# Patient Record
Sex: MALE | Race: WHITE | NOT HISPANIC OR LATINO | Employment: OTHER | URBAN - METROPOLITAN AREA
[De-identification: names, ages, dates, MRNs, and addresses within clinical notes are randomized per-mention and may not be internally consistent; named-entity substitution may affect disease eponyms.]

---

## 2018-08-15 ENCOUNTER — HOSPITAL ENCOUNTER (EMERGENCY)
Facility: HOSPITAL | Age: 73
Discharge: HOME | End: 2018-08-15
Attending: EMERGENCY MEDICINE
Payer: MEDICARE

## 2018-08-15 VITALS
WEIGHT: 170 LBS | HEIGHT: 67 IN | TEMPERATURE: 98 F | HEART RATE: 78 BPM | RESPIRATION RATE: 20 BRPM | SYSTOLIC BLOOD PRESSURE: 116 MMHG | DIASTOLIC BLOOD PRESSURE: 71 MMHG | BODY MASS INDEX: 26.68 KG/M2 | OXYGEN SATURATION: 95 %

## 2018-08-15 DIAGNOSIS — K59.00 CONSTIPATION, UNSPECIFIED CONSTIPATION TYPE: Primary | ICD-10-CM

## 2018-08-15 PROCEDURE — 99283 EMERGENCY DEPT VISIT LOW MDM: CPT | Mod: 25

## 2018-08-15 PROCEDURE — 25000003 PHARM REV CODE 250: Performed by: EMERGENCY MEDICINE

## 2018-08-15 RX ORDER — BISACODYL 5 MG
5 TABLET, DELAYED RELEASE (ENTERIC COATED) ORAL DAILY PRN
COMMUNITY
End: 2020-09-10

## 2018-08-15 RX ORDER — LACTULOSE 10 G/15ML
20 SOLUTION ORAL 2 TIMES DAILY
Qty: 600 ML | Refills: 0 | Status: SHIPPED | OUTPATIENT
Start: 2018-08-15 | End: 2018-08-25

## 2018-08-15 RX ORDER — FAMOTIDINE 20 MG/1
20 TABLET, FILM COATED ORAL 2 TIMES DAILY
Qty: 20 TABLET | Refills: 0 | Status: SHIPPED | OUTPATIENT
Start: 2018-08-15 | End: 2019-09-12

## 2018-08-15 RX ORDER — FUROSEMIDE 20 MG/1
20 TABLET ORAL 2 TIMES DAILY
COMMUNITY
End: 2020-08-19 | Stop reason: SDUPTHER

## 2018-08-15 RX ORDER — POLYETHYLENE GLYCOL 3350 17 G/17G
POWDER, FOR SOLUTION ORAL
COMMUNITY

## 2018-08-15 RX ORDER — TAMSULOSIN HYDROCHLORIDE 0.4 MG/1
0.4 CAPSULE ORAL DAILY
COMMUNITY

## 2018-08-15 RX ADMIN — LIDOCAINE HYDROCHLORIDE: 20 SOLUTION ORAL; TOPICAL at 06:08

## 2018-08-16 NOTE — DISCHARGE INSTRUCTIONS
Drink 3-4 Liters of water per day.  Take meds as prescribed.    F/U With your doctor next week.    Thank you for choosing [facility]! We appreciate you coming to us for your medical care. We hope you feel better soon! Please come back to [facility] for all of your future medical needs.      Sincerely,    Jarvis Swift MD  Medical Director  Emergency Department  [facility]

## 2018-08-26 NOTE — ED PROVIDER NOTES
"Encounter Date: 8/15/2018       History     Chief Complaint   Patient presents with    Constipation     Pt states has had problems with bowels "for a while."  pt states has been miralax and dulcolax with only small results, pt states bowels have not moved "this week."      HPI   This is a 73 y.o. male who has a past medical history of Coronary artery disease, GERD, Hyperlipidemia, and Hypertension.   The patient presents to the Emergency Department with constipation for months off and on.   Symptoms are associated with epigastric abdominal pain. Patient has a history of GERD as above.  Wife also states that patient forces emesis from time to time.  Pt denies fever, chills, blood in stool, bilious emesis.  Symptoms are aggravated by p.o. intake.  Symptoms are relieved by nothing.  Patient takes MiraLax and Dulcolax with only minor results.  Last BM was yesterday and consisted of small krystina.   Pt has a past surgical history that includes Arterial bypass surgry.     Review of patient's allergies indicates:  No Known Allergies  Past Medical History:   Diagnosis Date    Coronary artery disease     GERD (gastroesophageal reflux disease)     Hyperlipidemia     Hypertension      Past Surgical History:   Procedure Laterality Date    ARTERIAL BYPASS SURGRY       History reviewed. No pertinent family history.  Social History     Tobacco Use    Smoking status: Never Smoker    Smokeless tobacco: Never Used   Substance Use Topics    Alcohol use: No    Drug use: No     Review of Systems   Constitutional: Positive for appetite change. Negative for fever.   HENT: Negative for sore throat.    Respiratory: Negative for shortness of breath.    Cardiovascular: Negative for chest pain.   Gastrointestinal: Positive for constipation and vomiting (Forced). Negative for blood in stool and nausea.   Genitourinary: Negative for dysuria.   Musculoskeletal: Negative for back pain.   Skin: Negative for rash.   Neurological: Negative " for weakness.   Hematological: Does not bruise/bleed easily.       Physical Exam     Initial Vitals [08/15/18 1618]   BP Pulse Resp Temp SpO2   116/71 78 20 99.7 °F (37.6 °C) 95 %      MAP       --         Physical Exam    Nursing note and vitals reviewed.  Constitutional: He appears well-developed and well-nourished. No distress.   HENT:   Head: Normocephalic and atraumatic.   Mouth/Throat: Oropharynx is clear and moist.   Eyes: Conjunctivae are normal. Pupils are equal, round, and reactive to light.   Neck: Normal range of motion. Neck supple.   Cardiovascular: Normal rate, regular rhythm, normal heart sounds and intact distal pulses.   Pulmonary/Chest: Breath sounds normal. No respiratory distress. He has no wheezes. He has no rales.   Abdominal: Soft. Bowel sounds are normal. He exhibits distension (Fullness noted). There is no tenderness.   Musculoskeletal: Normal range of motion. He exhibits no edema or tenderness.   Lymphadenopathy:     He has no cervical adenopathy.   Neurological: He is alert and oriented to person, place, and time. He has normal strength.   Skin: Skin is warm and dry. Capillary refill takes less than 2 seconds. No rash noted. No erythema.   Psychiatric: He has a normal mood and affect. Thought content normal.         ED Course   Procedures  Labs Reviewed - No data to display       Imaging Results          X-Ray Abdomen Flat And Erect (Final result)  Result time 08/15/18 18:01:48    Final result by Rogelio Valera MD (08/15/18 18:01:48)                 Impression:      Moderate constipation.      Electronically signed by: Rogelio Valera MD  Date:    08/15/2018  Time:    18:01             Narrative:    EXAMINATION:  XR ABDOMEN FLAT AND ERECT    CLINICAL HISTORY:  eval for obstruction;    TECHNIQUE:  3 View of the abdomen was performed.    COMPARISON:  None    FINDINGS:  Nonobstructive bowel gas pattern.  Moderate constipation.    No obvious free air.  No portal venous gas.    No acute  fracture. Moderate DJD.  Lung bases are clear.  Bilateral inguinal hernia repair noted.                                 Medical Decision Making:   Initial Assessment:   This is an emergent evaluation of a 73 y.o.male patient with presentation of constipation.   Initial differentials include but are not limited to:  Constipation, obstruction, gastritis, gastric ulcer  Plan:  Obstructive series, laxative    ED Management:  Obstructive series unremarkable for SBO.  Patient has moderate constipation noted.  Overall impression is likely gastritis and constipation.   Rx for lactulose and Pepcid.  Recommended diet instructions and follow up with PCP.    Clinical impression and plan discussed with patient.    Pt is to call for follow up with PCP in 7 days.  Pt to return to the ED for any new or concerning symptoms.  Aftercare instructions and return precautions provided to patient.   Pt expressed understanding and agrees with plan.                         Clinical Impression:     1. Constipation, unspecified constipation type                                  Jarvis Swift MD  08/26/18 0034

## 2018-11-13 LAB
CHOL/HDLC RATIO: 2
CHOLEST SERPL-MSCNC: 130 MG/DL (ref 0–200)
HDLC SERPL-MCNC: 59 MG/DL
LDLC SERPL CALC-MCNC: 62 MG/DL
TRIGLYCERIDE (LIPID PAN): 57

## 2019-05-08 ENCOUNTER — HOSPITAL ENCOUNTER (EMERGENCY)
Facility: HOSPITAL | Age: 74
Discharge: HOME | End: 2019-05-08
Attending: SURGERY
Payer: MEDICARE

## 2019-05-08 VITALS
SYSTOLIC BLOOD PRESSURE: 117 MMHG | HEART RATE: 54 BPM | HEIGHT: 68 IN | BODY MASS INDEX: 26.93 KG/M2 | WEIGHT: 177.69 LBS | TEMPERATURE: 98 F | RESPIRATION RATE: 18 BRPM | OXYGEN SATURATION: 98 % | DIASTOLIC BLOOD PRESSURE: 67 MMHG

## 2019-05-08 DIAGNOSIS — K59.00 CONSTIPATION: Primary | ICD-10-CM

## 2019-05-08 DIAGNOSIS — R07.81 RIB PAIN ON RIGHT SIDE: ICD-10-CM

## 2019-05-08 LAB
BILIRUB UR QL STRIP: NEGATIVE
CLARITY UR REFRACT.AUTO: CLEAR
COLOR UR AUTO: YELLOW
GLUCOSE UR QL STRIP: NEGATIVE
HGB UR QL STRIP: NEGATIVE
KETONES UR QL STRIP: NEGATIVE
LEUKOCYTE ESTERASE UR QL STRIP: NEGATIVE
NITRITE UR QL STRIP: NEGATIVE
PH UR STRIP: 7 [PH] (ref 5–8)
PROT UR QL STRIP: NEGATIVE
SP GR UR STRIP: 1.01 (ref 1–1.03)
URN SPEC COLLECT METH UR: NORMAL
UROBILINOGEN UR STRIP-ACNC: NEGATIVE EU/DL

## 2019-05-08 PROCEDURE — 81003 URINALYSIS AUTO W/O SCOPE: CPT | Mod: ER

## 2019-05-08 PROCEDURE — 99284 EMERGENCY DEPT VISIT MOD MDM: CPT | Mod: ER

## 2019-05-08 RX ORDER — MELOXICAM 7.5 MG/1
7.5 TABLET ORAL DAILY
Qty: 10 TABLET | Status: SHIPPED | OUTPATIENT
Start: 2019-05-08 | End: 2019-09-12

## 2019-05-08 RX ORDER — LACTULOSE 10 G/15ML
15 SOLUTION ORAL 2 TIMES DAILY
Qty: 240 ML | Refills: 0 | Status: SHIPPED | OUTPATIENT
Start: 2019-05-08 | End: 2019-05-18

## 2019-05-08 NOTE — ED PROVIDER NOTES
Encounter Date: 5/8/2019       History     Chief Complaint   Patient presents with    Abdominal Pain     upper right side pain - been hurting for 5 days. denies taking anything for pain.     Complains of constipation, chronic and right rib pain localized over his chest wall x 5 days.  Patient denies any substernal chest pain    The history is provided by the patient.   Constipation    The current episode started several days ago. The problem occurs frequently. The problem has been unchanged. The pain is at a severity of 2/10. The stool is described as hard. There was no prior successful therapy. Prior unsuccessful therapies include diet changes.     Review of patient's allergies indicates:  No Known Allergies  Past Medical History:   Diagnosis Date    Coronary artery disease     GERD (gastroesophageal reflux disease)     Hyperlipidemia     Hypertension      Past Surgical History:   Procedure Laterality Date    ARTERIAL BYPASS SURGRY       No family history on file.  Social History     Tobacco Use    Smoking status: Never Smoker    Smokeless tobacco: Never Used   Substance Use Topics    Alcohol use: No    Drug use: No     Review of Systems   Constitutional: Negative.    HENT: Negative.    Eyes: Negative.    Respiratory: Negative.    Cardiovascular: Negative.    Gastrointestinal: Positive for constipation.   Endocrine: Negative.    Genitourinary: Negative.    Musculoskeletal: Negative.    Skin: Negative.    Allergic/Immunologic: Negative.    Neurological: Negative.    Hematological: Negative.    Psychiatric/Behavioral: Negative.        Physical Exam     Initial Vitals [05/08/19 1505]   BP Pulse Resp Temp SpO2   117/67 (!) 54 18 98.2 °F (36.8 °C) 98 %      MAP       --         Physical Exam    Nursing note and vitals reviewed.  Constitutional: He appears well-developed and well-nourished.   HENT:   Head: Normocephalic.   Mouth/Throat: Oropharynx is clear and moist.   Eyes: EOM are normal.   Cardiovascular:  Normal rate.   Pulmonary/Chest: Effort normal. No stridor.   Abdominal: Normal appearance and bowel sounds are normal. There is no rigidity and no CVA tenderness. No hernia.   Genitourinary: Testes normal.   Musculoskeletal:        Arms:  Neurological: He is alert.   Skin: Skin is warm. Capillary refill takes less than 2 seconds.   Psychiatric: He has a normal mood and affect.         ED Course   Procedures  Labs Reviewed   URINALYSIS, REFLEX TO URINE CULTURE    Narrative:     Preferred Collection Type->Urine, Clean Catch          Imaging Results          X-Ray Chest PA And Lateral (Final result)  Result time 05/08/19 15:43:54    Final result by Rogelio Valera MD (05/08/19 15:43:54)                 Impression:      No acute process seen.      Electronically signed by: Rogelio Valera MD  Date:    05/08/2019  Time:    15:43             Narrative:    EXAMINATION:  XR CHEST PA AND LATERAL    CLINICAL HISTORY:  Pleurodynia    COMPARISON:  None    FINDINGS:  Cardiac silhouette is normal.  The lungs demonstrate no evidence of active disease.  No evidence of pleural effusion or pneumothorax.  Multiple remote right-sided rib fractures are noted.  Moderate spondylosis.  Sternotomy wires are noted.                               X-Ray Abdomen AP 1 View (KUB) (Final result)  Result time 05/08/19 15:47:31    Final result by HALLE Castillo Sr., MD (05/08/19 15:47:31)                 Impression:      1. There is a prominent amount of fecal material within the colon.  2. Surgical changes      Electronically signed by: Vijay Castillo MD  Date:    05/08/2019  Time:    15:47             Narrative:    EXAMINATION:  XR ABDOMEN AP 1 VIEW    CLINICAL HISTORY:  Constipation, unspecified    COMPARISON:  08/15/2018    FINDINGS:  There is a prominent amount of fecal material within the colon.  There are surgical changes projected over the pelvis.  There is no pneumoperitoneum.  There are several sternotomy wires visualized.  There are  surgical clips projected over the mediastinum.                                 Medical Decision Making:   Initial Assessment:   Chest wall pain secondary to loose rib cartilage, constipated  ED Management:  Patient's chest x-ray does not show any acute pulmonary disease.  KUB shows constipation consistent with clinical picture.  Recommend laxatives and anti-inflammatories for rib pain                      Clinical Impression:       ICD-10-CM ICD-9-CM   1. Constipation K59.00 564.00   2. Rib pain on right side R07.81 786.50         Disposition:   Disposition: Discharged  Condition: Stable                        DELPHINE Eisenberg III, MD  05/09/19 1040

## 2019-08-05 ENCOUNTER — TELEPHONE (OUTPATIENT)
Dept: FAMILY MEDICINE | Facility: CLINIC | Age: 74
End: 2019-08-05

## 2019-08-05 NOTE — TELEPHONE ENCOUNTER
----- Message from Alice Wright sent at 8/5/2019 10:13 AM CDT -----  Contact: self, [phone]  Patient has an appointment scheduled on 9/3 but requests to be seen sooner if possible. States he was supposed to be seen in August when doctor was at . Also requests status of his medical records.

## 2019-09-03 ENCOUNTER — OFFICE VISIT (OUTPATIENT)
Dept: FAMILY MEDICINE | Facility: CLINIC | Age: 74
End: 2019-09-03
Payer: MEDICARE

## 2019-09-03 ENCOUNTER — TELEPHONE (OUTPATIENT)
Dept: FAMILY MEDICINE | Facility: CLINIC | Age: 74
End: 2019-09-03

## 2019-09-03 VITALS
BODY MASS INDEX: 26.86 KG/M2 | OXYGEN SATURATION: 96 % | WEIGHT: 177.25 LBS | DIASTOLIC BLOOD PRESSURE: 68 MMHG | HEIGHT: 68 IN | SYSTOLIC BLOOD PRESSURE: 136 MMHG | TEMPERATURE: 98 F | HEART RATE: 43 BPM

## 2019-09-03 DIAGNOSIS — G89.29 CHRONIC ABDOMINAL PAIN: ICD-10-CM

## 2019-09-03 DIAGNOSIS — K59.09 CHRONIC CONSTIPATION: ICD-10-CM

## 2019-09-03 DIAGNOSIS — I25.10 CORONARY ARTERY DISEASE INVOLVING NATIVE CORONARY ARTERY OF NATIVE HEART WITHOUT ANGINA PECTORIS: ICD-10-CM

## 2019-09-03 DIAGNOSIS — R10.9 CHRONIC ABDOMINAL PAIN: ICD-10-CM

## 2019-09-03 DIAGNOSIS — E78.2 HYPERLIPIDEMIA, MIXED: ICD-10-CM

## 2019-09-03 DIAGNOSIS — I11.9 HYPERTENSIVE HEART DISEASE WITHOUT HEART FAILURE: Primary | ICD-10-CM

## 2019-09-03 DIAGNOSIS — N40.0 BENIGN PROSTATIC HYPERPLASIA WITHOUT LOWER URINARY TRACT SYMPTOMS: ICD-10-CM

## 2019-09-03 PROCEDURE — 99214 OFFICE O/P EST MOD 30 MIN: CPT | Mod: S$PBB,,, | Performed by: INTERNAL MEDICINE

## 2019-09-03 PROCEDURE — 99999 PR PBB SHADOW E&M-EST. PATIENT-LVL III: ICD-10-PCS | Mod: PBBFAC,,, | Performed by: INTERNAL MEDICINE

## 2019-09-03 PROCEDURE — 99213 OFFICE O/P EST LOW 20 MIN: CPT | Mod: PBBFAC,PN | Performed by: INTERNAL MEDICINE

## 2019-09-03 PROCEDURE — 99999 PR PBB SHADOW E&M-EST. PATIENT-LVL III: CPT | Mod: PBBFAC,,, | Performed by: INTERNAL MEDICINE

## 2019-09-03 PROCEDURE — 99214 PR OFFICE/OUTPT VISIT, EST, LEVL IV, 30-39 MIN: ICD-10-PCS | Mod: S$PBB,,, | Performed by: INTERNAL MEDICINE

## 2019-09-03 RX ORDER — LACTULOSE 10 G/15ML
20 SOLUTION ORAL DAILY PRN
Qty: 300 ML | Refills: 5 | Status: SHIPPED | OUTPATIENT
Start: 2019-09-03 | End: 2021-07-20 | Stop reason: SDUPTHER

## 2019-09-03 NOTE — PROGRESS NOTES
[facility] Note    Chief Complaint      Chief Complaint   Patient presents with    Abdominal Pain    Bowel     on going problem       History of Present Illness      Lobo Marks is a 74 y.o. male with chronic conditions of HTN, HLD, CAD, BPH, GERD, anxiety who presents today for: re-establish care from  and complaints of abdominal pain.  Has been seeing Dr. Sharad Cr for this and has had a detailed workup.  Had plain film imaging done during ER visit for this in 5/2019 which showed a significant amount of retained feces.      HTN: BP at goal on ramipril, metoprolol, lasix.  HLD: Controlled on lipitor.  Will request lab records.    CAD, s/p CABG: sees cardiology, Dr. Jara.  Denies chest pain, shortness of breath.  On ASA, metoprolol, ramipril, atorvastatin.    BPH: Controlled on flomax.  No new or worsening symptoms.    Past Medical History:  Past Medical History:   Diagnosis Date    Coronary artery disease     GERD (gastroesophageal reflux disease)     Hyperlipidemia     Hypertension        Past Surgical History:   has a past surgical history that includes Arterial bypass surgry.    Family History:  family history is not on file.     Social History:  Social History     Tobacco Use    Smoking status: Never Smoker    Smokeless tobacco: Never Used   Substance Use Topics    Alcohol use: No    Drug use: No       Review of Systems   Constitutional: Negative for chills, fever and malaise/fatigue.   Respiratory: Negative for shortness of breath.    Cardiovascular: Negative for chest pain.   Skin: Negative for rash.   Neurological: Negative for weakness.        Medications:  Outpatient Encounter Medications as of 9/3/2019   Medication Sig Note Dispense Refill    aspirin (ECOTRIN) 81 MG EC tablet Take 81 mg by mouth once daily.       atorvastatin (LIPITOR) 40 MG tablet  10/22/2015: Received from: External Pharmacy      bisacodyl (DULCOLAX) 5 mg EC tablet Take 5 mg by mouth daily  "as needed for Constipation.       furosemide (LASIX) 20 MG tablet Take 20 mg by mouth 2 (two) times daily.       metoprolol tartrate (LOPRESSOR) 50 MG tablet  10/22/2015: Received from: External Pharmacy      ramipril (ALTACE) 5 MG capsule  10/22/2015: Received from: External Pharmacy      tamsulosin (FLOMAX) 0.4 mg Cap Take 0.4 mg by mouth once daily.       famotidine (PEPCID) 20 MG tablet Take 1 tablet (20 mg total) by mouth 2 (two) times daily.  20 tablet 0    meloxicam (MOBIC) 7.5 MG tablet Take 1 tablet (7.5 mg total) by mouth once daily. As needed for rib pain  10 tablet o    oxycodone-acetaminophen (PERCOCET) 5-325 mg per tablet  10/22/2015: Received from: External Pharmacy      polyethylene glycol (GLYCOLAX) 17 gram PwPk Take by mouth.        No facility-administered encounter medications on file as of 9/3/2019.        Allergies:  Review of patient's allergies indicates:  No Known Allergies    Health Maintenance:    There is no immunization history on file for this patient.   Health Maintenance   Topic Date Due    Hepatitis C Screening  1945    Lipid Panel  1945    TETANUS VACCINE  03/19/1964    Colonoscopy  03/19/1996    Pneumococcal Vaccine (65+ Low/Medium Risk) (1 of 2 - PCV13) 03/19/2011        Physical Exam      Vital Signs  Temp: 98.3 °F (36.8 °C)  Temp src: Oral  Pulse: (!) 43  SpO2: 96 %  BP: 136/68  BP Location: Right arm  Patient Position: Sitting  Pain Score:   8  Pain Loc: Abdomen  Height and Weight  Height: 5' 8" (172.7 cm)  Weight: 80.4 kg (177 lb 4 oz)  BSA (Calculated - sq m): 1.96 sq meters  BMI (Calculated): 27  Weight in (lb) to have BMI = 25: 164.1]    Physical Exam   Constitutional: He appears well-developed and well-nourished.   HENT:   Head: Normocephalic and atraumatic.   Right Ear: External ear normal.   Left Ear: External ear normal.   Mouth/Throat: Oropharynx is clear and moist.   Eyes: Pupils are equal, round, and reactive to light. Conjunctivae and EOM " are normal.   Cardiovascular: Normal rate, regular rhythm, normal heart sounds and intact distal pulses.   No murmur heard.  Pulmonary/Chest: Effort normal and breath sounds normal. He has no wheezes. He has no rales.   Abdominal: Soft. Bowel sounds are normal. He exhibits no distension and no abdominal bruit. There is no hepatosplenomegaly. There is tenderness (Epigastric/periumbilical, mild with deep palpation).   Vitals reviewed.       Laboratory:  CBC:      CMP:        Invalid input(s): CREATININ  URINALYSIS:  Recent Labs   Lab 05/08/19  1522   Color, UA Yellow   Specific Gravity, UA 1.010   pH, UA 7.0   Protein, UA Negative   Nitrite, UA Negative   Leukocytes, UA Negative   Urobilinogen, UA Negative      LIPIDS:      TSH:      A1C:        Assessment/Plan     Lobo Marks is a 74 y.o.male with:    1. Hypertensive heart disease without heart failure, stable  Cont current meds  2. Coronary artery disease involving native coronary artery of native heart without angina pectoris, stable   Cont current meds.    3. Hyperlipidemia, mixed, stable  Cont current meds.  4. Benign prostatic hyperplasia without lower urinary tract symptoms, stable  Cont current meds.    5. Chronic abdominal pain, unimproved  6. Chronic constipation   Still with significant constpiation despite laxatives.  Pt reports he has had some relief with lactulose so will prescribe.  Will refer to GI for second opinion.      Chronic conditions status updated as per HPI.  Other than changes above, cont current medications and maintain follow up with specialists.  Return to clinic in 6 months.    Rogelio Urbano MD  [facility] [place]

## 2019-09-05 ENCOUNTER — TELEPHONE (OUTPATIENT)
Dept: FAMILY MEDICINE | Facility: CLINIC | Age: 74
End: 2019-09-05

## 2019-09-05 ENCOUNTER — NURSE TRIAGE (OUTPATIENT)
Dept: ADMINISTRATIVE | Facility: CLINIC | Age: 74
End: 2019-09-05

## 2019-09-05 NOTE — TELEPHONE ENCOUNTER
----- Message from Tita Claros sent at 9/5/2019  4:00 PM CDT -----  Contact: Self [phone]  Patient is requesting to talk to nurse in regards to the medication for his bowls is not working.

## 2019-09-05 NOTE — TELEPHONE ENCOUNTER
Pt said the lactulose isnt working, explained to him it might take a couple of days for it to kick in. Asking if there is anything else he can take?

## 2019-09-06 NOTE — TELEPHONE ENCOUNTER
"Patient is calling with c/o the lactulose is not working. He states the dulcolax may have been better. I advised patient to the ED because he c/o severe fullness and patient stressing over no BM and his stomach hurts in the middle.     Reason for Disposition   Caller has NON-URGENT medication question about med that PCP prescribed and triager unable to answer question    Additional Information   Negative: Drug overdose and nurse unable to answer question   Negative: Caller requesting information not related to medicine   Negative: Caller requesting a prescription for Strep throat and has a positive culture result   Negative: Rash while taking a medication or within 3 days of stopping it   Negative: Immunization reaction suspected   Negative: [1] Asthma AND [2] having symptoms of asthma (cough, wheezing, etc)   Negative: MORE THAN A DOUBLE DOSE of a prescription or over-the-counter (OTC) drug   Negative: [1] DOUBLE DOSE (an extra dose or lesser amount) of over-the-counter (OTC) drug AND [2] any symptoms (e.g., dizziness, nausea, pain, sleepiness)   Negative: [1] DOUBLE DOSE (an extra dose or lesser amount) of prescription drug AND [2] any symptoms (e.g., dizziness, nausea, pain, sleepiness)   Negative: Took another person's prescription drug   Negative: [1] DOUBLE DOSE (an extra dose or lesser amount) of prescription drug AND [2] NO symptoms (Exception: a double dose of antibiotics)   Negative: Diabetes drug error or overdose (e.g., insulin or extra dose)   Negative: [1] Request for URGENT new prescription or refill of "essential" medication (i.e., likelihood of harm to patient if not taken) AND [2] triager unable to fill per unit policy   Negative: [1] Prescription not at pharmacy AND [2] was prescribed today by PCP   Negative: Pharmacy calling with prescription questions and triager unable to answer question   Negative: Caller has urgent medication question about med that PCP prescribed and triager " unable to answer question    Protocols used: MEDICATION QUESTION CALL-A-AH

## 2019-09-06 NOTE — TELEPHONE ENCOUNTER
Patient aware, has appt Next Thursday. He spoke to nurse last night they advised him to go to ER regarding severe pain, he did not go, and does not want to go.

## 2019-09-12 ENCOUNTER — LAB VISIT (OUTPATIENT)
Dept: LAB | Facility: HOSPITAL | Age: 74
End: 2019-09-12
Attending: INTERNAL MEDICINE
Payer: MEDICARE

## 2019-09-12 ENCOUNTER — OFFICE VISIT (OUTPATIENT)
Dept: GASTROENTEROLOGY | Facility: CLINIC | Age: 74
End: 2019-09-12
Payer: MEDICARE

## 2019-09-12 VITALS
HEIGHT: 68 IN | SYSTOLIC BLOOD PRESSURE: 110 MMHG | BODY MASS INDEX: 27.23 KG/M2 | DIASTOLIC BLOOD PRESSURE: 61 MMHG | WEIGHT: 179.69 LBS

## 2019-09-12 DIAGNOSIS — K58.1 IRRITABLE BOWEL SYNDROME WITH CONSTIPATION: ICD-10-CM

## 2019-09-12 DIAGNOSIS — K22.0 ACHALASIA: ICD-10-CM

## 2019-09-12 DIAGNOSIS — K58.1 IRRITABLE BOWEL SYNDROME WITH CONSTIPATION: Primary | ICD-10-CM

## 2019-09-12 LAB
ALBUMIN SERPL BCP-MCNC: 4 G/DL (ref 3.5–5.2)
ALP SERPL-CCNC: 54 U/L (ref 38–126)
ALT SERPL W/O P-5'-P-CCNC: 22 U/L (ref 10–44)
ANION GAP SERPL CALC-SCNC: 4 MMOL/L (ref 8–16)
AST SERPL-CCNC: 30 U/L (ref 15–46)
BASOPHILS # BLD AUTO: 0.08 K/UL (ref 0–0.2)
BASOPHILS NFR BLD: 1.3 % (ref 0–1.9)
BILIRUB SERPL-MCNC: 0.5 MG/DL (ref 0.1–1)
BUN SERPL-MCNC: 12 MG/DL (ref 2–20)
CALCIUM SERPL-MCNC: 9.2 MG/DL (ref 8.7–10.5)
CHLORIDE SERPL-SCNC: 98 MMOL/L (ref 95–110)
CO2 SERPL-SCNC: 34 MMOL/L (ref 23–29)
CREAT SERPL-MCNC: 0.86 MG/DL (ref 0.5–1.4)
DIFFERENTIAL METHOD: ABNORMAL
EOSINOPHIL # BLD AUTO: 0.3 K/UL (ref 0–0.5)
EOSINOPHIL NFR BLD: 4.2 % (ref 0–8)
ERYTHROCYTE [DISTWIDTH] IN BLOOD BY AUTOMATED COUNT: 12.2 % (ref 11.5–14.5)
EST. GFR  (AFRICAN AMERICAN): >60 ML/MIN/1.73 M^2
EST. GFR  (NON AFRICAN AMERICAN): >60 ML/MIN/1.73 M^2
GLUCOSE SERPL-MCNC: 79 MG/DL (ref 70–110)
HCT VFR BLD AUTO: 41.4 % (ref 40–54)
HGB BLD-MCNC: 13.6 G/DL (ref 14–18)
LYMPHOCYTES # BLD AUTO: 1.8 K/UL (ref 1–4.8)
LYMPHOCYTES NFR BLD: 30.7 % (ref 18–48)
MCH RBC QN AUTO: 32.8 PG (ref 27–31)
MCHC RBC AUTO-ENTMCNC: 32.9 G/DL (ref 32–36)
MCV RBC AUTO: 100 FL (ref 82–98)
MONOCYTES # BLD AUTO: 0.9 K/UL (ref 0.3–1)
MONOCYTES NFR BLD: 14.3 % (ref 4–15)
NEUTROPHILS # BLD AUTO: 3 K/UL (ref 1.8–7.7)
NEUTROPHILS NFR BLD: 49.5 % (ref 38–73)
PLATELET # BLD AUTO: 256 K/UL (ref 150–350)
PMV BLD AUTO: 9.4 FL (ref 9.2–12.9)
POTASSIUM SERPL-SCNC: 4.3 MMOL/L (ref 3.5–5.1)
PROT SERPL-MCNC: 7.3 G/DL (ref 6–8.4)
RBC # BLD AUTO: 4.15 M/UL (ref 4.6–6.2)
SODIUM SERPL-SCNC: 136 MMOL/L (ref 136–145)
TSH SERPL DL<=0.005 MIU/L-ACNC: 2.13 UIU/ML (ref 0.4–4)
WBC # BLD AUTO: 6 K/UL (ref 3.9–12.7)

## 2019-09-12 PROCEDURE — 99214 OFFICE O/P EST MOD 30 MIN: CPT | Mod: PBBFAC,PN | Performed by: INTERNAL MEDICINE

## 2019-09-12 PROCEDURE — 36415 COLL VENOUS BLD VENIPUNCTURE: CPT | Mod: PO

## 2019-09-12 PROCEDURE — 99999 PR PBB SHADOW E&M-EST. PATIENT-LVL IV: CPT | Mod: PBBFAC,,, | Performed by: INTERNAL MEDICINE

## 2019-09-12 PROCEDURE — 99205 PR OFFICE/OUTPT VISIT, NEW, LEVL V, 60-74 MIN: ICD-10-PCS | Mod: S$PBB,,, | Performed by: INTERNAL MEDICINE

## 2019-09-12 PROCEDURE — 80053 COMPREHEN METABOLIC PANEL: CPT | Mod: PO

## 2019-09-12 PROCEDURE — 99999 PR PBB SHADOW E&M-EST. PATIENT-LVL IV: ICD-10-PCS | Mod: PBBFAC,,, | Performed by: INTERNAL MEDICINE

## 2019-09-12 PROCEDURE — 85025 COMPLETE CBC W/AUTO DIFF WBC: CPT | Mod: PO

## 2019-09-12 PROCEDURE — 84443 ASSAY THYROID STIM HORMONE: CPT | Mod: PO

## 2019-09-12 PROCEDURE — 99205 OFFICE O/P NEW HI 60 MIN: CPT | Mod: S$PBB,,, | Performed by: INTERNAL MEDICINE

## 2019-09-12 NOTE — LETTER
September 12, 2019      Rogelio Urbano MD  [address]  [address]  [place] [address]           [place] [facility]  [address], [address]  [place] [address]  Phone: [phone]  Fax: [phone]          Patient: Lobo Marks   MR Number: [id number]   YOB: 1945   Date of Visit: 9/12/2019       Dear Dr. Rogelio Urbano:    Thank you for referring Lobo Marks to me for evaluation. Attached you will find relevant portions of my assessment and plan of care.    If you have questions, please do not hesitate to call me. I look forward to following Lobo Marks along with you.    Sincerely,    Pedro Pereira MD    Enclosure  CC:  No Recipients    If you would like to receive this communication electronically, please contact [email or web address] or [phone] to request more information on [employer] access.    For providers and/or their staff who would like to refer a patient to [facility], please contact us through our one-stop-shop provider referral line, [facility], at [phone].    If you feel you have received this communication in error or would no longer like to receive these types of communications, please e-mail [email or web address]

## 2019-09-12 NOTE — PATIENT INSTRUCTIONS
EGD Prep Instructions    [facility] [address] [place] [address]    You are scheduled for an EGD with Dr. Pereira on 10/2/19 at [facility] located at [address] [place] [address].  Check in at the admit desk, first floor of the hospital (which is the building on the left).   You will receive a call 2-3 days before your EGD to tell you the time to arrive.  If you have not received a call by the day before your procedure, call the Endoscopy Lab at [phone].    Nothing to eat or drink after midnight before the procedure.  You MAY brush your teeth.    You MAY take your blood pressure, heart, and seizure medication on the morning of the procedure, with a SIP of water.  Hold ALL other medications until after the procedure.    If you are on blood thinners THAT YOU HAVE BEEN INSTRUCTED TO HOLD BY YOUR DOCTOR FOR THIS PROCEDURE, then do NOT take this the morning of your EGD.  Do NOT stop these medications on your own, they must be approved to be held by your doctor.  Your EGD can NOT be done if you are on these medications.  Examples of blood thinners include: Coumadin, Aggrenox, Plavix, Pradaxa, Reapro, Pletal, Xarelto, Ticagrelor, Brilinta, Eliquis, and high dose aspirin (325 mg).  You do not have to stop baby aspirin 81 mg.    You will receive a call 2-3 days before your EGD to tell you the time to arrive.  If you have not received a call by the day before your procedure, call the Endoscopy department at [phone].

## 2019-09-12 NOTE — PROGRESS NOTES
GASTROENTEROLOGY CLINIC NOTE    Reason for visit: The primary encounter diagnosis was Irritable bowel syndrome with constipation. A diagnosis of Achalasia was also pertinent to this visit.  Referring provider/PCP: Rogelio Urbano MD      HPI:  Lobo Marks is a 74 y.o. male here today for chronic constipation. 2nd opinoin. Previously followed with Dr. King at [facility]. Referred by Dr. Urbano.  Hx including CAD s/p CABG on asa, BPH, achalasia.    Patient has longstanding issues of swallowing.  He regurgitates many of his foods.  He feels like the food sits in the bottom of his chest.  He has trouble keeping thick foods down.  This has been an ongoing issue for many years.  He has seen Dr. Casarez in the past for this.  He has been diagnosed with achalasia in the past.  He has refused surgery in the past.  He has undergone EGD with dilation in the past.     He also has chronic constipation.  This has been a multiyear problem.  He has tried many medications including the ones listed below.  He has associated symptoms of bloating abdominal discomfort and pain in the lower abdomen and the left upper quadrant as well. He has had to undergo a cleanout with Dr. Alexander in the past.    meds tried:  miralax  Dulcolax  lactulose      Prior Endoscopy:  Last colonoscopy was 8/2020, one small polyp, Dr. Cr recommended 5 year interval.   Last EGD was 2018. savaray dilation to 16mm.    (Portions of this note were dictated using voice recognition software and may contain dictation related errors in spelling/grammar/syntax not found on text review)    Review of Systems   Constitutional: Negative for chills and fever.   Eyes: Negative for blurred vision and double vision.   Respiratory: Negative for cough and shortness of breath.    Cardiovascular: Negative for chest pain and palpitations.   Gastrointestinal: Positive for abdominal pain, constipation, nausea and vomiting. Negative for blood in stool.  "  Musculoskeletal: Positive for neck pain. Negative for myalgias.   Skin: Negative for itching and rash.   Neurological: Negative for dizziness and headaches.   Psychiatric/Behavioral: Positive for memory loss. The patient is nervous/anxious.        Past Medical History: has a past medical history of Coronary artery disease, GERD (gastroesophageal reflux disease), Hyperlipidemia, and Hypertension.    Past Surgical History: has a past surgical history that includes Arterial bypass surgry.    Family History:family history is not on file.    Allergies: Review of patient's allergies indicates:  No Known Allergies    Social History: reports that he has never smoked. He has never used smokeless tobacco. He reports that he does not drink alcohol or use drugs.    Home medications:   Current Outpatient Medications on File Prior to Visit   Medication Sig Dispense Refill    aspirin (ECOTRIN) 81 MG EC tablet Take 81 mg by mouth once daily.      atorvastatin (LIPITOR) 40 MG tablet       bisacodyl (DULCOLAX) 5 mg EC tablet Take 5 mg by mouth daily as needed for Constipation.      furosemide (LASIX) 20 MG tablet Take 20 mg by mouth 2 (two) times daily.      lactulose (CHRONULAC) 20 gram/30 mL Soln Take 30 mLs (20 g total) by mouth daily as needed. 300 mL 5    metoprolol tartrate (LOPRESSOR) 50 MG tablet       polyethylene glycol (GLYCOLAX) 17 gram PwPk Take by mouth.      ramipril (ALTACE) 5 MG capsule       tamsulosin (FLOMAX) 0.4 mg Cap Take 0.4 mg by mouth once daily.      [DISCONTINUED] famotidine (PEPCID) 20 MG tablet Take 1 tablet (20 mg total) by mouth 2 (two) times daily. 20 tablet 0    [DISCONTINUED] meloxicam (MOBIC) 7.5 MG tablet Take 1 tablet (7.5 mg total) by mouth once daily. As needed for rib pain 10 tablet o    [DISCONTINUED] oxycodone-acetaminophen (PERCOCET) 5-325 mg per tablet        No current facility-administered medications on file prior to visit.        Vital signs:  /61   Ht 5' 8" (1.727 " m)   Wt 81.5 kg (179 lb 10.8 oz)   BMI 27.32 kg/m²     Physical Exam   Constitutional: No distress.   Eyes: Conjunctivae are normal. No scleral icterus.   Pulmonary/Chest: Effort normal. No respiratory distress.   Abdominal: Soft. He exhibits distension. He exhibits no mass.   Skin: Skin is warm. No rash noted.   Psychiatric: He has a normal mood and affect. His behavior is normal.   Vitals reviewed.      Routine labs:  Lab Results   Component Value Date    WBC 11.15 (H) 01/31/2011    HGB 12.5 (L) 01/31/2011    HCT 37.8 (L) 01/31/2011    MCV 96.4 (H) 01/31/2011     01/31/2011     Lab Results   Component Value Date    INR 1.0 01/31/2011     No results found for: IRON, FERRITIN, TIBC, FESATURATED  Lab Results   Component Value Date     01/31/2011    K 4.2 01/31/2011     01/31/2011    CO2 26 01/31/2011    BUN 13 01/31/2011    CREATININE 0.9 01/31/2011     Lab Results   Component Value Date    ALBUMIN 3.0 (L) 01/30/2011    ALT 49 (H) 01/30/2011    AST 43 (H) 01/30/2011    ALKPHOS 47 (L) 01/30/2011    BILITOT 0.5 01/30/2011     No results found for: GLUCOSE    Imaging:  Reviewed    I have reviewed over 40 pages of records the patient brings in the office.    Last colonoscopy was 8/2020, one small polyp, Dr. Cr recommended 5 year interval.   Last EGD was 2018.     Assessment:    1. Irritable bowel syndrome with constipation    2. Achalasia        Plan:    Orders Placed This Encounter    Comprehensive metabolic panel    TSH    CBC auto differential    linaCLOtide (LINZESS) 290 mcg Cap capsule    Case request GI: EGD (ESOPHAGOGASTRODUODENOSCOPY)     I had multiple long discussions with the patient. I also discussed with his wife and the patient separately after our visit.  These discussions lasted approximately 90 min in the clinic.    Regarding his difficulty swallowing, regurgitation.  I am fairly confident with the diagnosis of achalasia that has been given by Dr. Cr and supported by the  previous studies that he has had including esophagram as well as EGD.  I reviewed his records that he brought today that showed the birds beaking of the esophagus on 1 of his esophagram.  It seems as he has undergone dilations in the past (I note one dilation with savary to 16mm).  He is unsure if he has he had Botox before.  He has visited with Dr. Casarez many years back in 2015.  He tells me that he is not interested in proceeding with surgery and refuses another evaluation with Dr. Casarez.  I also mentioned the new technique of POEM for the treatment of achalasia.  He again refused a referral for this.  I discussed that if he were to 1 further investigation for his achalasia, the next step would be to repeat an EGD and we can consider dilation or Botox at that time.  He is interested in proceeding with Botox and EGD.    Regarding his constipation, I stressed the importance of a daily regimen.  He is very reluctant to even consider Linzess because it has been cost prohibitive in the past.  I will send a prescription to his pharmacy and we will work through the prior authorization process and even a patient assistance process to see if we can get him this medication.  In the meantime I recommended a MiraLax cleanout.  He should also consider a magnesium citrate cleanout.  I stressed that he needs to be on a daily regimen of MiraLax and possibly a little magnesium citrate, this must be self titrated to goal of having a bowel movement every other day or so.  If ultimately he does not clean out with MiraLax, I recommended a over-the-counter enema until he has a decent bowel movement.    He is due for colonoscopy August of 2020 and we will send him a reminder for this.      RTC based on endoscopy findings.    A total of 90 minutes were spent face-to-face with the patient during this encounter and over half of that time was spent on counseling and coordination of care.     Kevin P. Cowley, MD [facility] [place]

## 2019-09-13 ENCOUNTER — TELEPHONE (OUTPATIENT)
Dept: GASTROENTEROLOGY | Facility: CLINIC | Age: 74
End: 2019-09-13

## 2019-09-13 NOTE — TELEPHONE ENCOUNTER
----- Message from Tita Claros sent at 9/13/2019  2:26 PM CDT -----  Contact: Self [phone]  TEST RESULTS:   Patient would like to get test results.  Name of test (lab, mammo, etc.): Labs   Date of test: 9/11/19

## 2019-09-19 ENCOUNTER — TELEPHONE (OUTPATIENT)
Dept: GASTROENTEROLOGY | Facility: CLINIC | Age: 74
End: 2019-09-19

## 2019-09-19 NOTE — TELEPHONE ENCOUNTER
----- Message from Марина Arce sent at 9/19/2019  3:16 PM CDT -----  Contact: [phone]-self  Pt is requesting a callback from office, reason not given.

## 2019-09-25 ENCOUNTER — TELEPHONE (OUTPATIENT)
Dept: GASTROENTEROLOGY | Facility: CLINIC | Age: 74
End: 2019-09-25

## 2019-09-25 NOTE — TELEPHONE ENCOUNTER
Spoke with pt and went over Full liquid diet on 9/29/19-9/30/19. Clear Liquid diet on 10/1/19. Diet sheets mailed out to patients address. Verbal Understanding.

## 2019-09-26 ENCOUNTER — TELEPHONE (OUTPATIENT)
Dept: GASTROENTEROLOGY | Facility: CLINIC | Age: 74
End: 2019-09-26

## 2019-09-26 NOTE — TELEPHONE ENCOUNTER
----- Message from Karen Lora sent at 9/26/2019  3:58 PM CDT -----  Contact: KULDIP VANEGAS [[id number]]  [phone]    Patient needs to speak to Allegra. He stated it is urgent he speaks with you today before you leave.

## 2019-09-27 ENCOUNTER — TELEPHONE (OUTPATIENT)
Dept: GASTROENTEROLOGY | Facility: CLINIC | Age: 74
End: 2019-09-27

## 2019-09-27 NOTE — TELEPHONE ENCOUNTER
----- Message from Karen Paulino sent at 9/27/2019 10:41 AM CDT -----  Contact: KULDIP VANEGAS [[id number]] [phone]    Patient received the instructions to prepare for his procedure, but he said he needs to speak with Allegra. He has questions about these instructions.

## 2019-09-27 NOTE — TELEPHONE ENCOUNTER
All questions were answered regarding procedure scheduled on 10/2/19.         Patients wife's number [phone]

## 2019-09-30 ENCOUNTER — TELEPHONE (OUTPATIENT)
Dept: GASTROENTEROLOGY | Facility: CLINIC | Age: 74
End: 2019-09-30

## 2019-09-30 ENCOUNTER — TELEPHONE (OUTPATIENT)
Dept: ENDOSCOPY | Facility: HOSPITAL | Age: 74
End: 2019-09-30

## 2019-09-30 NOTE — TELEPHONE ENCOUNTER
----- Message from Annalee Serra sent at 9/30/2019 10:09 AM CDT -----  Pt called to speak with Lilibeth, please give pt a call back at [phone] concerning testing appt.      Thank you!

## 2019-09-30 NOTE — TELEPHONE ENCOUNTER
Patient would like to cancel procedure because he stated that the liquid diet is too hard. He said that he will call back and reschedule once he discussed this with his wife. He said that he would like to call in a couple of months to reschedule.

## 2019-12-03 ENCOUNTER — TELEPHONE (OUTPATIENT)
Dept: GASTROENTEROLOGY | Facility: CLINIC | Age: 74
End: 2019-12-03

## 2019-12-03 NOTE — TELEPHONE ENCOUNTER
----- Message from Margaret Madera sent at 12/2/2019  4:28 PM CST -----  Contact: [phone]/self   Patient is requesting to speak with nurse regarding scheduling some test. Please advise.

## 2019-12-03 NOTE — TELEPHONE ENCOUNTER
Spoke with pt and he would like to reschedule the EGD. Discussed dates with pt and he stated that he would talk with his wife and call me back with the date that he would like to have the EGD. Patient stated that he would like to have a colonoscopy because he does not have a BM every day. Discussed constipation plan again with Miralax, Magnesium citrate, and Enema's.

## 2020-01-28 ENCOUNTER — TELEPHONE (OUTPATIENT)
Dept: GASTROENTEROLOGY | Facility: CLINIC | Age: 75
End: 2020-01-28

## 2020-01-28 NOTE — TELEPHONE ENCOUNTER
----- Message from Allegra Hawk MA sent at 9/12/2019  4:06 PM CDT -----  Ask Korin about the Gastroparesis diet prior to EGD.       Liquid diet 2 days prior. Clear liquids the day before. NPO by midnight   Briseyda Frias(Attending)

## 2020-01-28 NOTE — TELEPHONE ENCOUNTER
----- Message from Joy Womack sent at 1/27/2020  4:46 PM CST -----  Contact: Pt   Pt requesting speak with Nurse Allegra Pt states that its urgent    Please call and advise    Phone [phone]

## 2020-01-28 NOTE — TELEPHONE ENCOUNTER
Spoke with pt and he would like to reschedule the EGD. Patient has been given dates. He will call us back with a date that he would like to schedule.       Patient will have to be on full liquids 3 days prior and clear liquids 1 day prior.

## 2020-02-18 ENCOUNTER — PATIENT OUTREACH (OUTPATIENT)
Dept: ADMINISTRATIVE | Facility: HOSPITAL | Age: 75
End: 2020-02-18

## 2020-02-20 ENCOUNTER — PATIENT OUTREACH (OUTPATIENT)
Dept: ADMINISTRATIVE | Facility: HOSPITAL | Age: 75
End: 2020-02-20

## 2020-03-02 ENCOUNTER — TELEPHONE (OUTPATIENT)
Dept: FAMILY MEDICINE | Facility: CLINIC | Age: 75
End: 2020-03-02

## 2020-03-02 NOTE — TELEPHONE ENCOUNTER
----- Message from Safia Lisa sent at 3/2/2020  8:31 AM CST -----  Contact: [phone]/ self   Patient requesting to speak with you regarding getting scheduled for an appointment in the evening. Please call.

## 2020-03-17 ENCOUNTER — TELEPHONE (OUTPATIENT)
Dept: FAMILY MEDICINE | Facility: CLINIC | Age: 75
End: 2020-03-17

## 2020-08-05 ENCOUNTER — PATIENT OUTREACH (OUTPATIENT)
Dept: ADMINISTRATIVE | Facility: HOSPITAL | Age: 75
End: 2020-08-05

## 2020-08-05 ENCOUNTER — TELEPHONE (OUTPATIENT)
Dept: ADMINISTRATIVE | Facility: HOSPITAL | Age: 75
End: 2020-08-05

## 2020-08-19 ENCOUNTER — OFFICE VISIT (OUTPATIENT)
Dept: FAMILY MEDICINE | Facility: CLINIC | Age: 75
End: 2020-08-19
Payer: MEDICARE

## 2020-08-19 ENCOUNTER — TELEPHONE (OUTPATIENT)
Dept: FAMILY MEDICINE | Facility: CLINIC | Age: 75
End: 2020-08-19

## 2020-08-19 ENCOUNTER — TELEPHONE (OUTPATIENT)
Dept: GASTROENTEROLOGY | Facility: CLINIC | Age: 75
End: 2020-08-19

## 2020-08-19 VITALS
OXYGEN SATURATION: 95 % | SYSTOLIC BLOOD PRESSURE: 130 MMHG | TEMPERATURE: 98 F | HEART RATE: 44 BPM | DIASTOLIC BLOOD PRESSURE: 70 MMHG | WEIGHT: 170.88 LBS | BODY MASS INDEX: 25.9 KG/M2 | HEIGHT: 68 IN

## 2020-08-19 DIAGNOSIS — I11.9 HYPERTENSIVE HEART DISEASE WITHOUT HEART FAILURE: ICD-10-CM

## 2020-08-19 DIAGNOSIS — Z23 NEED FOR VACCINATION: ICD-10-CM

## 2020-08-19 DIAGNOSIS — N40.0 BENIGN PROSTATIC HYPERPLASIA WITHOUT LOWER URINARY TRACT SYMPTOMS: ICD-10-CM

## 2020-08-19 DIAGNOSIS — I87.2 CHRONIC VENOUS INSUFFICIENCY: ICD-10-CM

## 2020-08-19 DIAGNOSIS — E78.2 HYPERLIPIDEMIA, MIXED: ICD-10-CM

## 2020-08-19 DIAGNOSIS — I25.10 CORONARY ARTERY DISEASE INVOLVING NATIVE CORONARY ARTERY OF NATIVE HEART WITHOUT ANGINA PECTORIS: Primary | ICD-10-CM

## 2020-08-19 DIAGNOSIS — Z12.11 SCREEN FOR COLON CANCER: ICD-10-CM

## 2020-08-19 PROCEDURE — 99214 PR OFFICE/OUTPT VISIT, EST, LEVL IV, 30-39 MIN: ICD-10-PCS | Mod: S$PBB,,, | Performed by: INTERNAL MEDICINE

## 2020-08-19 PROCEDURE — G0009 ADMIN PNEUMOCOCCAL VACCINE: HCPCS | Mod: PBBFAC,PN

## 2020-08-19 PROCEDURE — 99214 OFFICE O/P EST MOD 30 MIN: CPT | Mod: PBBFAC,PN | Performed by: INTERNAL MEDICINE

## 2020-08-19 PROCEDURE — 99214 OFFICE O/P EST MOD 30 MIN: CPT | Mod: S$PBB,,, | Performed by: INTERNAL MEDICINE

## 2020-08-19 PROCEDURE — 99999 PR PBB SHADOW E&M-EST. PATIENT-LVL IV: CPT | Mod: PBBFAC,,, | Performed by: INTERNAL MEDICINE

## 2020-08-19 PROCEDURE — 99999 PR PBB SHADOW E&M-EST. PATIENT-LVL IV: ICD-10-PCS | Mod: PBBFAC,,, | Performed by: INTERNAL MEDICINE

## 2020-08-19 RX ORDER — FUROSEMIDE 20 MG/1
20 TABLET ORAL 2 TIMES DAILY
Qty: 60 TABLET | Refills: 5 | Status: SHIPPED | OUTPATIENT
Start: 2020-08-19 | End: 2021-07-20 | Stop reason: SDUPTHER

## 2020-08-19 NOTE — TELEPHONE ENCOUNTER
----- Message from Rogelio Urbano MD sent at 8/19/2020  1:52 PM CDT -----  Regarding: EGD/Cscope  Allegra,   I saw Mr. Marks today in clinic.  I think he is ready to schedule his EGD and Cscope. Can you please contact him to schedule.    Thanks,  Rogelio Urbano

## 2020-08-19 NOTE — TELEPHONE ENCOUNTER
----- Message from Samina Parks sent at 8/19/2020  4:24 PM CDT -----  Type:  Patient Returning Call    Who Called:pt   Who Left Message for Patient:pt  Does the patient know what this is regarding?: pt want to know if take both meds at the same time or one at night   Would the patient rather a call back or a response via [facility]?  Call   Best Call Back Number:[phone]  Additional Information:  call back

## 2020-08-19 NOTE — PROGRESS NOTES
[facility] Note    Chief Complaint      Chief Complaint   Patient presents with    Hypertension     6 month f/u    Follow-up       History of Present Illness      Lobo Marks is a 75 y.o. male with chronic conditions of HTN, CAD, HLD, BPH who presents today for: follow up chronic conditions.    HTN: BP at goal on ramipril, metoprolol, lasix.  HLD: Controlled on lipitor.  Will update.    CAD, s/p CABG: previously seen cardiology, Dr. Jara. Will establish with Dr. Atkinson.  Denies chest pain, shortness of breath.  On ASA, metoprolol, ramipril, atorvastatin.    BPH: Controlled on flomax.  No new or worsening symptoms.  Flu shot due when available.    Cscope 2015, Dr. Cr, no polyps, 5 yr interval.    Past Medical History:  Past Medical History:   Diagnosis Date    Coronary artery disease     GERD (gastroesophageal reflux disease)     Hyperlipidemia     Hypertension        Past Surgical History:   has a past surgical history that includes Arterial bypass surgry.    Family History:  family history is not on file.     Social History:  Social History     Tobacco Use    Smoking status: Never Smoker    Smokeless tobacco: Never Used   Substance Use Topics    Alcohol use: No    Drug use: No       I personally reviewed all past medical, surgical, social and family history.    Review of Systems   Constitutional: Negative for chills, fever and malaise/fatigue.   Respiratory: Negative for shortness of breath.    Cardiovascular: Negative for chest pain.   Gastrointestinal: Negative for constipation, diarrhea, nausea and vomiting.   Skin: Negative for rash.   Neurological: Negative for weakness.   All other systems reviewed and are negative.       Medications:  Outpatient Encounter Medications as of 8/19/2020   Medication Sig Note Dispense Refill    aspirin (ECOTRIN) 81 MG EC tablet Take 81 mg by mouth once daily.       atorvastatin (LIPITOR) 40 MG tablet  10/22/2015: Received from: External  "Pharmacy      bisacodyl (DULCOLAX) 5 mg EC tablet Take 5 mg by mouth daily as needed for Constipation.       metoprolol tartrate (LOPRESSOR) 50 MG tablet  10/22/2015: Received from: External Pharmacy      polyethylene glycol (GLYCOLAX) 17 gram PwPk Take by mouth.       ramipril (ALTACE) 5 MG capsule  10/22/2015: Received from: External Pharmacy      tamsulosin (FLOMAX) 0.4 mg Cap Take 0.4 mg by mouth once daily.       furosemide (LASIX) 20 MG tablet Take 1 tablet (20 mg total) by mouth 2 (two) times daily.  60 tablet 5    lactulose (CHRONULAC) 20 gram/30 mL Soln Take 30 mLs (20 g total) by mouth daily as needed.  300 mL 5    linaCLOtide (LINZESS) 290 mcg Cap capsule Take 1 capsule (290 mcg total) by mouth once daily. (Patient not taking: Reported on 8/19/2020)  30 capsule 3    OPW TEST CLAIM - DO NOT FILL Take by mouth. OPW test claim. Do not fill. (Patient not taking: Reported on 8/19/2020)  10 tablet 0    [DISCONTINUED] furosemide (LASIX) 20 MG tablet Take 20 mg by mouth 2 (two) times daily.        No facility-administered encounter medications on file as of 8/19/2020.        Allergies:  Review of patient's allergies indicates:  No Known Allergies    Health Maintenance:  Immunization History   Administered Date(s) Administered    Influenza 11/30/2014, 12/08/2015, 11/12/2019    Influenza - High Dose - PF (65 years and older) 11/30/2014, 12/08/2015, 11/12/2019      Health Maintenance   Topic Date Due    Hepatitis C Screening  1945    TETANUS VACCINE  03/19/1964    Pneumococcal Vaccine (65+ Low/Medium Risk) (1 of 2 - PCV13) 03/19/2011    High Dose Statin  08/19/2021    Aspirin/Antiplatelet Therapy  08/19/2021    Lipid Panel  11/13/2023        Physical Exam      Vital Signs  Temp: 97.6 °F (36.4 °C)  Temp src: Oral  Pulse: (!) 44  SpO2: 95 %  BP: 130/70  BP Location: Right arm  Patient Position: Sitting  Height and Weight  Height: 5' 8" (172.7 cm)  Weight: 77.5 kg (170 lb 13.7 oz)  BSA " (Calculated - sq m): 1.93 sq meters  BMI (Calculated): 26  Weight in (lb) to have BMI = 25: 164.1]    Physical Exam  Vitals signs reviewed.   Constitutional:       Appearance: He is well-developed.   HENT:      Head: Normocephalic and atraumatic.      Right Ear: External ear normal.      Left Ear: External ear normal.   Eyes:      Conjunctiva/sclera: Conjunctivae normal.      Pupils: Pupils are equal, round, and reactive to light.   Cardiovascular:      Rate and Rhythm: Normal rate and regular rhythm.      Heart sounds: Normal heart sounds. No murmur.   Pulmonary:      Effort: Pulmonary effort is normal.      Breath sounds: Normal breath sounds. No wheezing or rales.   Abdominal:      General: Bowel sounds are normal. There is no distension or abdominal bruit.      Palpations: Abdomen is soft.      Tenderness: There is no abdominal tenderness.          Laboratory:  CBC:  Recent Labs   Lab 09/12/19  1552   WBC 6.00   RBC 4.15 L   Hemoglobin 13.6 L   Hematocrit 41.4   Platelets 256   Mean Corpuscular Volume 100 H   Mean Corpuscular Hemoglobin 32.8 H   Mean Corpuscular Hemoglobin Conc 32.9     CMP:  Recent Labs   Lab 09/12/19  1552   Glucose 79   Calcium 9.2   Albumin 4.0   Total Protein 7.3   Sodium 136   Potassium 4.3   CO2 34 H   Chloride 98   BUN, Bld 12   Alkaline Phosphatase 54   ALT 22   AST 30   Total Bilirubin 0.5     URINALYSIS:  Recent Labs   Lab 05/08/19  1522   Color, UA Yellow   Specific Gravity, UA 1.010   pH, UA 7.0   Protein, UA Negative   Nitrite, UA Negative   Leukocytes, UA Negative   Urobilinogen, UA Negative      LIPIDS:  Recent Labs   Lab 11/13/18 09/12/19  1552   TSH  --  2.130   HDL 59  --    Cholesterol 130  --    LDL Cholesterol 62  --      TSH:  Recent Labs   Lab 09/12/19  1552   TSH 2.130     A1C:        Assessment/Plan     Lobo Marks is a 75 y.o.male with:    1. Coronary artery disease involving native coronary artery of native heart without angina pectoris  - Comprehensive  metabolic panel; Future  - Lipid Panel; Future  Continue current meds.  F/U with Dr. Atkinson to establish  2. Hypertensive heart disease without heart failure  - Comprehensive metabolic panel; Future  - Lipid Panel; Future  Continue current meds.    3. Hyperlipidemia, mixed  Continue current meds.  Update labs  4. Benign prostatic hyperplasia without lower urinary tract symptoms  Continue current meds.    5. Screen for colon cancer  - Ambulatory referral/consult to Gastroenterology; Future    6. Chronic venous insufficiency  - furosemide (LASIX) 20 MG tablet; Take 1 tablet (20 mg total) by mouth 2 (two) times daily.  Dispense: 60 tablet; Refill: 5       Chronic conditions status updated as per HPI.  Other than changes above, cont current medications and maintain follow up with specialists.  Return to clinic in 6 months.    Rogelio Urbano MD  [facility]

## 2020-08-20 ENCOUNTER — TELEPHONE (OUTPATIENT)
Dept: FAMILY MEDICINE | Facility: CLINIC | Age: 75
End: 2020-08-20

## 2020-08-20 DIAGNOSIS — Z12.5 PROSTATE CANCER SCREENING: Primary | ICD-10-CM

## 2020-08-20 NOTE — TELEPHONE ENCOUNTER
----- Message from Dariana Matamoros sent at 8/20/2020  2:09 PM CDT -----  Type:  Patient Requesting Call    Who Called: Don  Would the patient rather a call back or a response via [facility]?  Call back   Best Call Back Number: [phone]  Additional Information:  would like labs to be sent to [facility], would also like to add PSA lab

## 2020-09-09 ENCOUNTER — PATIENT OUTREACH (OUTPATIENT)
Dept: ADMINISTRATIVE | Facility: OTHER | Age: 75
End: 2020-09-09

## 2020-09-09 NOTE — PROGRESS NOTES
Health Maintenance Due   Topic Date Due    Hepatitis C Screening  1945    TETANUS VACCINE  03/19/1964    Shingles Vaccine (1 of 2) 03/19/1996    Influenza Vaccine (1) 08/01/2020    Colorectal Cancer Screening  08/06/2020     Updates were requested from care everywhere.  Chart was reviewed for overdue Proactive [facility] ([facility]) topics (CRS, Breast Cancer Screening, Eye exam)  Health Maintenance has been updated.  [facility] immunization registry triggered.  Immunizations were reconciled.

## 2020-09-10 ENCOUNTER — OFFICE VISIT (OUTPATIENT)
Dept: GASTROENTEROLOGY | Facility: CLINIC | Age: 75
End: 2020-09-10
Payer: MEDICARE

## 2020-09-10 VITALS — HEIGHT: 68 IN | WEIGHT: 170.88 LBS | BODY MASS INDEX: 25.9 KG/M2

## 2020-09-10 DIAGNOSIS — K22.0 ACHALASIA: Primary | ICD-10-CM

## 2020-09-10 PROCEDURE — 99213 OFFICE O/P EST LOW 20 MIN: CPT | Mod: PBBFAC,PN | Performed by: INTERNAL MEDICINE

## 2020-09-10 PROCEDURE — 99214 OFFICE O/P EST MOD 30 MIN: CPT | Mod: S$PBB,,, | Performed by: INTERNAL MEDICINE

## 2020-09-10 PROCEDURE — 99999 PR PBB SHADOW E&M-EST. PATIENT-LVL III: CPT | Mod: PBBFAC,,, | Performed by: INTERNAL MEDICINE

## 2020-09-10 PROCEDURE — 99214 PR OFFICE/OUTPT VISIT, EST, LEVL IV, 30-39 MIN: ICD-10-PCS | Mod: S$PBB,,, | Performed by: INTERNAL MEDICINE

## 2020-09-10 PROCEDURE — 99999 PR PBB SHADOW E&M-EST. PATIENT-LVL III: ICD-10-PCS | Mod: PBBFAC,,, | Performed by: INTERNAL MEDICINE

## 2020-09-10 NOTE — PATIENT INSTRUCTIONS
Full Liquid diet the whole day before      EGD Prep Instructions    [facility] [address] [place] [address]    You are scheduled for an EGD with Dr. Pereira on                   at [facility] located at [address] [place] [address].  Check in at the admit desk, first floor of the hospital (which is the building on the left).   You will receive a call 2-3 days before your EGD to tell you the time to arrive.  If you have not received a call by the day before your procedure, call the Endoscopy Lab at [phone].    Nothing to eat or drink after midnight before the procedure.  You MAY brush your teeth.    You MAY take your blood pressure, heart, and seizure medication on the morning of the procedure, with a SIP of water.  Hold ALL other medications until after the procedure.    If you are on blood thinners THAT YOU HAVE BEEN INSTRUCTED TO HOLD BY YOUR DOCTOR FOR THIS PROCEDURE, then do NOT take this the morning of your EGD.  Do NOT stop these medications on your own, they must be approved to be held by your doctor.  Your EGD can NOT be done if you are on these medications.  Examples of blood thinners include: Coumadin, Aggrenox, Plavix, Pradaxa, Reapro, Pletal, Xarelto, Ticagrelor, Brilinta, Eliquis, and high dose aspirin (325 mg).  You do not have to stop baby aspirin 81 mg.    You will receive a call 2-3 days before your EGD to tell you the time to arrive.  If you have not received a call by the day before your procedure, call the Endoscopy department at [phone].

## 2020-09-10 NOTE — PROGRESS NOTES
GASTROENTEROLOGY CLINIC NOTE    Reason for visit: The encounter diagnosis was Achalasia.  Referring provider/PCP: Rogelio Urbano MD      HPI:  Lobo Marks is a 75 y.o. male here today for chronic constipation. 2nd opinoin. Previously followed with Dr. King at [facility]. Referred by Dr. Urbano.  Hx including CAD s/p CABG on asa, BPH, achalasia.    Interval:  Still with symptoms of dysphagia. Has epigastric discomfort at times. Feels foods stick. Also has back of mouth problems. Not taking ppi or antacid.  No radiating or alleviating facotrs. Ongoing + many years  He cancelled procedure last time.      ==================  Initial clinic visit  Patient has longstanding issues of swallowing.  He regurgitates many of his foods.  He feels like the food sits in the bottom of his chest.  He has trouble keeping thick foods down.  This has been an ongoing issue for many years.  He has seen Dr. Casarez in the past for this.  He has been diagnosed with achalasia in the past.  He has refused surgery in the past.  He has undergone EGD with dilation in the past.     He also has chronic constipation.  This has been a multiyear problem.  He has tried many medications including the ones listed below.  He has associated symptoms of bloating abdominal discomfort and pain in the lower abdomen and the left upper quadrant as well. He has had to undergo a cleanout with Dr. Alexander in the past.    meds tried:  miralax  Dulcolax  lactulose      Prior Endoscopy:  Last colonoscopy was 8/2020, one small polyp, Dr. Cr recommended 5 year interval.   Last EGD was 2018. savaray dilation to 16mm.    (Portions of this note were dictated using voice recognition software and may contain dictation related errors in spelling/grammar/syntax not found on text review)    Review of Systems   Constitutional: Positive for weight loss. Negative for malaise/fatigue.   Respiratory: Negative for cough and hemoptysis.    Gastrointestinal:  "Positive for abdominal pain, heartburn and nausea.   Neurological: Negative for dizziness and headaches.   Psychiatric/Behavioral: The patient is not nervous/anxious and does not have insomnia.        Past Medical History: has a past medical history of Colon polyps, Coronary artery disease, GERD (gastroesophageal reflux disease), Hyperlipidemia, and Hypertension.    Past Surgical History: has a past surgical history that includes Arterial bypass surgry.    Family History:family history is not on file.    Allergies: Review of patient's allergies indicates:  No Known Allergies    Social History: reports that he has never smoked. He has never used smokeless tobacco. He reports that he does not drink alcohol or use drugs.    Home medications:   Current Outpatient Medications on File Prior to Visit   Medication Sig Dispense Refill    aspirin (ECOTRIN) 81 MG EC tablet Take 81 mg by mouth once daily.      atorvastatin (LIPITOR) 40 MG tablet       furosemide (LASIX) 20 MG tablet Take 1 tablet (20 mg total) by mouth 2 (two) times daily. 60 tablet 5    lactulose (CHRONULAC) 20 gram/30 mL Soln Take 30 mLs (20 g total) by mouth daily as needed. 300 mL 5    metoprolol tartrate (LOPRESSOR) 50 MG tablet       polyethylene glycol (GLYCOLAX) 17 gram PwPk Take by mouth.      ramipril (ALTACE) 5 MG capsule       tamsulosin (FLOMAX) 0.4 mg Cap Take 0.4 mg by mouth once daily.      OPW TEST CLAIM - DO NOT FILL Take by mouth. OPW test claim. Do not fill. (Patient not taking: Reported on 8/19/2020) 10 tablet 0    [DISCONTINUED] bisacodyl (DULCOLAX) 5 mg EC tablet Take 5 mg by mouth daily as needed for Constipation.      [DISCONTINUED] linaCLOtide (LINZESS) 290 mcg Cap capsule Take 1 capsule (290 mcg total) by mouth once daily. (Patient not taking: Reported on 8/19/2020) 30 capsule 3     No current facility-administered medications on file prior to visit.        Vital signs:  Ht 5' 8" (1.727 m)   Wt 77.5 kg (170 lb 13.7 oz)   " BMI 25.98 kg/m²     Physical Exam  Vitals signs reviewed.   Constitutional:       General: He is not in acute distress.  Eyes:      General: No scleral icterus.     Conjunctiva/sclera: Conjunctivae normal.   Pulmonary:      Effort: Pulmonary effort is normal. No respiratory distress.   Abdominal:      General: There is distension.      Palpations: Abdomen is soft. There is no mass.   Skin:     General: Skin is warm.      Findings: No rash.   Neurological:      Mental Status: He is alert.   Psychiatric:         Mood and Affect: Mood normal.         Behavior: Behavior normal.         Routine labs:  Lab Results   Component Value Date    WBC 6.00 09/12/2019    HGB 13.6 (L) 09/12/2019    HCT 41.4 09/12/2019     (H) 09/12/2019     09/12/2019     Lab Results   Component Value Date    INR 1.0 01/31/2011     No results found for: IRON, FERRITIN, TIBC, FESATURATED  Lab Results   Component Value Date     09/12/2019    K 4.3 09/12/2019    CL 98 09/12/2019    CO2 34 (H) 09/12/2019    BUN 12 09/12/2019    CREATININE 0.86 09/12/2019     Lab Results   Component Value Date    ALBUMIN 4.0 09/12/2019    ALT 22 09/12/2019    AST 30 09/12/2019    ALKPHOS 54 09/12/2019    BILITOT 0.5 09/12/2019     No results found for: GLUCOSE    Imaging:  Reviewed    I have reviewed over 40 pages of records the patient brings in the office.    Last colonoscopy was 8/2020, one small polyp, Dr. Cr recommended 5 year interval.   Last EGD was 2018.   EGD dilated to 16mm savary  There appears to be an EGD for botox.      Assessment:  1. Achalasia      Continued symptoms.  I have again discussed the ideology and pathogenesis achalasia I have again discussed the potential therapies.  I offered for him to visit with the surgeon again.  I also offered EGD with possible dilation and possible Botox.  He is unwilling to commit to any form of therapy although he does seem to wish to proceed with an EGD.  I will plan to perform Botox during  the next EGD unless he is unwilling during the day of procedure    Plan:  Orders Placed This Encounter    COVID-19 Routine Screening    Case request GI: EGD (ESOPHAGOGASTRODUODENOSCOPY)     EGD with dilation and BOTOX for achalasia  Again, he refuses surgical consultation    He wishes to defer the colonoscopy for now.      RTC after above      Pedro Pereira MD  [facility]

## 2020-10-14 ENCOUNTER — TELEPHONE (OUTPATIENT)
Dept: GASTROENTEROLOGY | Facility: CLINIC | Age: 75
End: 2020-10-14

## 2020-10-14 NOTE — TELEPHONE ENCOUNTER
----- Message from Elissa Kaur sent at 10/14/2020 12:26 PM CDT -----  Patient would like to get a call back     Please call [phone]

## 2020-10-14 NOTE — TELEPHONE ENCOUNTER
Spoke with pt and he would like to still have the EGD, but his wife had surgery. He will call back to schedule.

## 2020-10-26 ENCOUNTER — TELEPHONE (OUTPATIENT)
Dept: FAMILY MEDICINE | Facility: CLINIC | Age: 75
End: 2020-10-26

## 2020-10-26 DIAGNOSIS — I25.10 CORONARY ARTERY DISEASE INVOLVING NATIVE CORONARY ARTERY OF NATIVE HEART WITHOUT ANGINA PECTORIS: Primary | ICD-10-CM

## 2020-10-26 DIAGNOSIS — Z12.5 PROSTATE CANCER SCREENING: ICD-10-CM

## 2020-10-26 DIAGNOSIS — I11.9 HYPERTENSIVE HEART DISEASE WITHOUT HEART FAILURE: ICD-10-CM

## 2020-10-26 NOTE — TELEPHONE ENCOUNTER
----- Message from Ismael Bhakta sent at 10/26/2020 12:04 PM CDT -----  Type:  Needs Medical Advice    Who Called: self  Reason:blood work orders  Would the patient rather a call back or a response via [facility]? call  Best Call Back Number: [phone]  Additional Information: none

## 2020-10-26 NOTE — TELEPHONE ENCOUNTER
Patient went to [facility] and did not like it there so he is wanting his blood work [facility].

## 2020-12-01 ENCOUNTER — TELEPHONE (OUTPATIENT)
Dept: FAMILY MEDICINE | Facility: CLINIC | Age: 75
End: 2020-12-01

## 2020-12-01 NOTE — TELEPHONE ENCOUNTER
----- Message from Melva Bautista sent at 12/1/2020  3:28 PM CST -----  Regarding: lab orders  Type:  Patient Returning Call    Who Called:patient   Who Left Message for Patient:n/a  Does the patient know what this is regarding?:lab orders put into the system  Would the patient rather a call back or a response via [facility]? N/a  Best Call Back Number:n/a  Additional Information: n/a

## 2020-12-02 ENCOUNTER — TELEPHONE (OUTPATIENT)
Dept: GASTROENTEROLOGY | Facility: CLINIC | Age: 75
End: 2020-12-02

## 2020-12-02 NOTE — TELEPHONE ENCOUNTER
Patient said that his wife had back surgery on 11/10/2020. He is unsure when he will be able to have the EGD because he does not have a  home. He said that he would call back if he finds a .     He is worried about the procedure expiring. He really wants to have it done.

## 2020-12-02 NOTE — TELEPHONE ENCOUNTER
----- Message from Rachel Doty sent at 12/2/2020  1:35 PM CST -----  Contact: [phone]  Pt KULDIP VANEGAS calling regarding P needing to speak with the office      Please call and advise

## 2020-12-16 ENCOUNTER — TELEPHONE (OUTPATIENT)
Dept: FAMILY MEDICINE | Facility: CLINIC | Age: 75
End: 2020-12-16

## 2020-12-16 NOTE — TELEPHONE ENCOUNTER
----- Message from María Lopez sent at 12/16/2020  4:33 PM CST -----  Type:  Patient Returning Call    Who Called: Don   Would the patient rather a call back or a response via [employer]? Call back   Best Call Back Number: [phone]   Additional Information: Pt is requesting for office  to give him a call about blood work

## 2020-12-18 ENCOUNTER — LAB VISIT (OUTPATIENT)
Dept: LAB | Facility: HOSPITAL | Age: 75
End: 2020-12-18
Attending: INTERNAL MEDICINE
Payer: MEDICARE

## 2020-12-18 DIAGNOSIS — I11.9 HYPERTENSIVE HEART DISEASE WITHOUT HEART FAILURE: ICD-10-CM

## 2020-12-18 LAB
BILIRUB UR QL STRIP: NEGATIVE
CLARITY UR REFRACT.AUTO: CLEAR
COLOR UR AUTO: ABNORMAL
GLUCOSE UR QL STRIP: NEGATIVE
HGB UR QL STRIP: NEGATIVE
KETONES UR QL STRIP: ABNORMAL
LEUKOCYTE ESTERASE UR QL STRIP: NEGATIVE
NITRITE UR QL STRIP: NEGATIVE
PH UR STRIP: 6 [PH] (ref 5–8)
PROT UR QL STRIP: ABNORMAL
SP GR UR STRIP: 1.01 (ref 1–1.03)
URN SPEC COLLECT METH UR: ABNORMAL
UROBILINOGEN UR STRIP-ACNC: 1 EU/DL

## 2020-12-18 PROCEDURE — 81003 URINALYSIS AUTO W/O SCOPE: CPT | Mod: PO

## 2020-12-23 ENCOUNTER — TELEPHONE (OUTPATIENT)
Dept: FAMILY MEDICINE | Facility: CLINIC | Age: 75
End: 2020-12-23

## 2020-12-23 NOTE — TELEPHONE ENCOUNTER
----- Message from Mayte South sent at 12/23/2020  2:45 PM CST -----  Contact: [phone]  Type:  Test Results    Who Called:  Pt  Name of Test (Lab/Mammo/Etc):  Blood work  Date of Test:  12/18  Ordering Provider:  Dr Ubrano  Where the test was performed:  [facility], pt states it is Urgent  Would the patient rather a call back or a response via [facility]?  Call back  Best Call Back Number: [phone] or [phone]

## 2020-12-24 ENCOUNTER — TELEPHONE (OUTPATIENT)
Dept: FAMILY MEDICINE | Facility: CLINIC | Age: 75
End: 2020-12-24

## 2020-12-24 NOTE — TELEPHONE ENCOUNTER
----- Message from María Lopez sent at 12/23/2020  5:12 PM CST -----  Type:  Patient Returning Call    Who Called: Don   Would the patient rather a call back or a response via [facility]? Call back   Best Call Back Number: [phone]  Additional Information:    Pt call for results, said that the results can be sent to his email ,[email or web address]  [phone]/ cell phone    PT WANTS RESULTS TONIGHT                           [phone]

## 2020-12-24 NOTE — TELEPHONE ENCOUNTER
----- Message from Mayte South sent at 12/24/2020  9:04 AM CST -----  Contact: [phone]  Type:  Needs Medical Advice    Who Called:  Pt  Reason for Call: pt would like to know his Glucose Levels, pt states it is Urgent  Would the patient rather a call back or a response via [facility]?  Call back  Best Call Back Number: [phone]

## 2020-12-24 NOTE — TELEPHONE ENCOUNTER
Called and spoke with pt in regards of his message. Pt is calling in regards of his lab results. Pt states that he has not heard anything about his lab results, and they need to be addressed ASAP. Please advise.

## 2020-12-30 ENCOUNTER — TELEPHONE (OUTPATIENT)
Dept: FAMILY MEDICINE | Facility: CLINIC | Age: 75
End: 2020-12-30

## 2020-12-30 NOTE — TELEPHONE ENCOUNTER
----- Message from Elissa Kaur sent at 12/30/2020  8:06 AM CST -----  Type:  Test Results    Who Called: Patient  Name of Test (Lab/Mammo/Etc): labs  Date of Test: 12/18/2020  Ordering Provider: Yolie  Where the test was performed: [facility]  Would the patient rather a call back or a response via [facility]? call    Additional Information:    Patient would like to get results mailed out he said he never received them

## 2020-12-31 ENCOUNTER — TELEPHONE (OUTPATIENT)
Dept: FAMILY MEDICINE | Facility: CLINIC | Age: 75
End: 2020-12-31

## 2020-12-31 ENCOUNTER — NURSE TRIAGE (OUTPATIENT)
Dept: ADMINISTRATIVE | Facility: CLINIC | Age: 75
End: 2020-12-31

## 2020-12-31 NOTE — TELEPHONE ENCOUNTER
----- Message from Coty Barone sent at 12/31/2020  1:42 PM CST -----  Type:  Needs Medical Advice    Who Called: pt  Advice Regarding: clarity on labs   Would the patient rather a call back or a response via [facility]? call  Best Call Back Number:   Additional Information: n/a

## 2020-12-31 NOTE — TELEPHONE ENCOUNTER
----- Message from Coty Barone sent at 12/31/2020  2:57 PM CST -----  Patient is following up on a message sent earlier re: clarity on labs. He asked to have it marked urgent. [phone]

## 2020-12-31 NOTE — TELEPHONE ENCOUNTER
Reason for Disposition   Lab result questions   [1] Follow-up call from patient regarding patient's clinical status AND [2] information NON-URGENT    Additional Information   Negative: Lab calling with strep throat test results and triager can call in prescription   Negative: Lab calling with urinalysis test results and triager can call in prescription   Negative: Medication questions   Negative: ED call to PCP   Negative: Physician call to PCP   Negative: Call about patient who is currently hospitalized   Negative: Lab or radiology calling with CRITICAL test results   Negative: [1] Prescription not at pharmacy AND [2] was prescribed today by PCP   Negative: [1] Follow-up call from patient regarding patient's clinical status AND [2] information urgent   Negative: [1] Caller requests to speak ONLY to PCP AND [2] URGENT question   Negative: [1] Caller requests to speak to PCP now AND [2] won't tell us reason for call  (Exception: if 10 pm to 6 am, caller must first discuss reason for the call)   Negative: Notification of hospital admission   Negative: Notification of death    Protocols used: INFORMATION ONLY CALL - NO TRIAGE-A-AH, PCP CALL - NO TRIAGE-A-AH

## 2020-12-31 NOTE — TELEPHONE ENCOUNTER
----- Message from María Lopez sent at 12/31/2020 12:42 PM CST -----  Type:  Patient Returning Call    Who Called: Pawan   Would the patient rather a call back or a response via [employer]? Call back   Best Call Back Number: [phone]   Additional Information: Pt wants a return call to explain his blood work because some things he don't understand

## 2020-12-31 NOTE — TELEPHONE ENCOUNTER
"Pt requesting more in depth details about the labs that were abnormal. Pt states that he sees the labs "in yellow" and would like to know exactly what they mean. Please advise.   "

## 2020-12-31 NOTE — TELEPHONE ENCOUNTER
Had blood test done last Friday. Nurse called results and told him that everything was ok. Pt request a copy and the results came in the mail. Calling to discuss some of the results that are yellow. States he will call the office when it opens.

## 2021-01-03 NOTE — TELEPHONE ENCOUNTER
Ok to schedule an appt to discuss lab results.  Or he can wait until his next scheduled appt 2/17.

## 2021-02-17 ENCOUNTER — OFFICE VISIT (OUTPATIENT)
Dept: FAMILY MEDICINE | Facility: CLINIC | Age: 76
End: 2021-02-17
Payer: MEDICARE

## 2021-02-17 VITALS
BODY MASS INDEX: 25.01 KG/M2 | DIASTOLIC BLOOD PRESSURE: 58 MMHG | TEMPERATURE: 98 F | HEART RATE: 71 BPM | WEIGHT: 165 LBS | HEIGHT: 68 IN | SYSTOLIC BLOOD PRESSURE: 118 MMHG | OXYGEN SATURATION: 96 % | RESPIRATION RATE: 16 BRPM

## 2021-02-17 DIAGNOSIS — I11.9 HYPERTENSIVE HEART DISEASE WITHOUT HEART FAILURE: ICD-10-CM

## 2021-02-17 DIAGNOSIS — G89.29 CHRONIC ABDOMINAL PAIN: ICD-10-CM

## 2021-02-17 DIAGNOSIS — R10.9 CHRONIC ABDOMINAL PAIN: ICD-10-CM

## 2021-02-17 DIAGNOSIS — E78.2 HYPERLIPIDEMIA, MIXED: ICD-10-CM

## 2021-02-17 DIAGNOSIS — I25.10 CORONARY ARTERY DISEASE INVOLVING NATIVE CORONARY ARTERY OF NATIVE HEART WITHOUT ANGINA PECTORIS: Primary | ICD-10-CM

## 2021-02-17 DIAGNOSIS — N40.0 BENIGN PROSTATIC HYPERPLASIA WITHOUT LOWER URINARY TRACT SYMPTOMS: ICD-10-CM

## 2021-02-17 PROCEDURE — 99999 PR PBB SHADOW E&M-EST. PATIENT-LVL IV: CPT | Mod: PBBFAC,,, | Performed by: INTERNAL MEDICINE

## 2021-02-17 PROCEDURE — 99214 OFFICE O/P EST MOD 30 MIN: CPT | Mod: PBBFAC,PN | Performed by: INTERNAL MEDICINE

## 2021-02-17 PROCEDURE — 99999 PR PBB SHADOW E&M-EST. PATIENT-LVL IV: ICD-10-PCS | Mod: PBBFAC,,, | Performed by: INTERNAL MEDICINE

## 2021-02-17 PROCEDURE — 99214 PR OFFICE/OUTPT VISIT, EST, LEVL IV, 30-39 MIN: ICD-10-PCS | Mod: S$PBB,,, | Performed by: INTERNAL MEDICINE

## 2021-02-17 PROCEDURE — 99214 OFFICE O/P EST MOD 30 MIN: CPT | Mod: S$PBB,,, | Performed by: INTERNAL MEDICINE

## 2021-02-17 RX ORDER — DOCUSATE SODIUM 100 MG/1
100 CAPSULE, LIQUID FILLED ORAL 2 TIMES DAILY
COMMUNITY

## 2021-02-18 ENCOUNTER — TELEPHONE (OUTPATIENT)
Dept: ADMINISTRATIVE | Facility: HOSPITAL | Age: 76
End: 2021-02-18

## 2021-07-20 DIAGNOSIS — G89.29 CHRONIC ABDOMINAL PAIN: ICD-10-CM

## 2021-07-20 DIAGNOSIS — K59.09 CHRONIC CONSTIPATION: ICD-10-CM

## 2021-07-20 DIAGNOSIS — R10.9 CHRONIC ABDOMINAL PAIN: ICD-10-CM

## 2021-07-20 DIAGNOSIS — I87.2 CHRONIC VENOUS INSUFFICIENCY: ICD-10-CM

## 2021-07-20 RX ORDER — FUROSEMIDE 20 MG/1
20 TABLET ORAL 2 TIMES DAILY
Qty: 180 TABLET | Refills: 1 | Status: SHIPPED | OUTPATIENT
Start: 2021-07-20

## 2021-07-20 RX ORDER — LACTULOSE 10 G/15ML
20 SOLUTION ORAL DAILY PRN
Qty: 300 ML | Refills: 5 | Status: SHIPPED | OUTPATIENT
Start: 2021-07-20

## 2021-08-03 ENCOUNTER — PATIENT OUTREACH (OUTPATIENT)
Dept: ADMINISTRATIVE | Facility: HOSPITAL | Age: 76
End: 2021-08-03

## 2021-08-03 ENCOUNTER — TELEPHONE (OUTPATIENT)
Dept: ADMINISTRATIVE | Facility: HOSPITAL | Age: 76
End: 2021-08-03

## 2023-11-15 NOTE — TELEPHONE ENCOUNTER
Spoke with pt and he continues to have constipation. He stated that he had a small BM on  9/12/19 and every day after. Called [facility] to check on the Linzess and it cost $42 for a 30 day supply. Patient said that this is too expensive for him. He had c/o ABD pain, Bloating and constipation today with a small BM this morning. Patient has been advised to drink 1 bottle of Magnesium citrate today. If he does not have a BM in a couple of hours to do 1-2 enema's today. Take 1 capful of Miralax 2-3 times a day, increase his water intake, and exercise regularly. Try this method over the weekend and to call us back Monday if he still does not have a regular BM. Also if symptoms worsen to proceed to the ED.      Miralax 2-3 times daily   1 bottle of Magnesium citrate   1-2 enema's    alert

## 2024-06-17 ENCOUNTER — HOSPITAL ENCOUNTER (OUTPATIENT)
Dept: RADIOLOGY | Facility: HOSPITAL | Age: 79
Discharge: HOME | End: 2024-06-17
Attending: FAMILY MEDICINE
Payer: MEDICARE

## 2024-06-17 DIAGNOSIS — M54.50 LUMBAGO: ICD-10-CM

## 2024-06-17 PROCEDURE — 72100 X-RAY EXAM L-S SPINE 2/3 VWS: CPT | Mod: TC,FY,PN

## 2024-06-17 PROCEDURE — 72100 X-RAY EXAM L-S SPINE 2/3 VWS: CPT | Mod: 26,,, | Performed by: RADIOLOGY

## 2024-06-17 PROCEDURE — 72170 X-RAY EXAM OF PELVIS: CPT | Mod: 26,,, | Performed by: RADIOLOGY

## 2024-06-17 PROCEDURE — 72170 X-RAY EXAM OF PELVIS: CPT | Mod: TC,FY,PN

## 2025-09-05 ENCOUNTER — HOSPITAL ENCOUNTER (OUTPATIENT)
Dept: RADIOLOGY | Facility: HOSPITAL | Age: 80
Discharge: HOME | End: 2025-09-05
Attending: FAMILY MEDICINE
Payer: MEDICARE

## 2025-09-05 DIAGNOSIS — J18.9 PNEUMONIA: Primary | ICD-10-CM

## 2025-09-05 DIAGNOSIS — J18.9 PNEUMONIA: ICD-10-CM

## 2025-09-05 PROCEDURE — 71046 X-RAY EXAM CHEST 2 VIEWS: CPT | Mod: 26,,, | Performed by: RADIOLOGY

## 2025-09-05 PROCEDURE — 71046 X-RAY EXAM CHEST 2 VIEWS: CPT | Mod: TC,PN
